# Patient Record
Sex: FEMALE | Race: WHITE
[De-identification: names, ages, dates, MRNs, and addresses within clinical notes are randomized per-mention and may not be internally consistent; named-entity substitution may affect disease eponyms.]

---

## 2019-09-05 ENCOUNTER — HOSPITAL ENCOUNTER (EMERGENCY)
Dept: HOSPITAL 62 - ER | Age: 64
Discharge: HOME | End: 2019-09-05
Payer: SELF-PAY

## 2019-09-05 VITALS — SYSTOLIC BLOOD PRESSURE: 110 MMHG | DIASTOLIC BLOOD PRESSURE: 85 MMHG
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
## 2019-09-05 DIAGNOSIS — Z79.899: ICD-10-CM

## 2019-09-05 DIAGNOSIS — M25.522: ICD-10-CM

## 2019-09-05 DIAGNOSIS — M79.642: ICD-10-CM

## 2019-09-05 DIAGNOSIS — M25.532: ICD-10-CM

## 2019-09-05 DIAGNOSIS — S52.125A: Primary | ICD-10-CM

## 2019-09-05 PROCEDURE — 2W3DX1Z IMMOBILIZATION OF LEFT LOWER ARM USING SPLINT: ICD-10-PCS | Performed by: EMERGENCY MEDICINE

## 2019-09-05 PROCEDURE — 99283 EMERGENCY DEPT VISIT LOW MDM: CPT

## 2019-09-05 NOTE — RADIOLOGY REPORT (SQ)
EXAM DESCRIPTION:  HAND LEFT 3 VIEWS; WRIST LEFT 3 VIEWS; ELBOW LEFT AP/LATERAL



COMPLETED DATE/TIME:  9/5/2019 12:48 pm



REASON FOR STUDY:  fall, wrist/hand injury; fall, FOOSH, elbow pain



COMPARISON:  None.



EXAM PARAMETERS:  NUMBER OF VIEWS: Seven views.

TECHNIQUE: Two views left elbow, three views left wrist, three views left hand.

LIMITATIONS: None.



FINDINGS:  MINERALIZATION: Osteopenia.

BONES: Nondisplaced fracture of the distal radial metaphysis.

JOINTS: Erosive arthropathy base of 1st metacarpal.

SOFT TISSUES: No foreign body.

OTHER: No other significant finding.



IMPRESSION:  Nondisplaced fracture distal radius.



TECHNICAL DOCUMENTATION:  JOB ID:  [id number]

 2011 [employer]- All Rights Reserved



Reading location - IP/workstation name: [email or web address]

## 2019-09-05 NOTE — RADIOLOGY REPORT (SQ)
EXAM DESCRIPTION:  HAND LEFT 3 VIEWS; WRIST LEFT 3 VIEWS; ELBOW LEFT AP/LATERAL



COMPLETED DATE/TIME:  9/5/2019 12:48 pm



REASON FOR STUDY:  fall, wrist/hand injury; fall, FOOSH, elbow pain



COMPARISON:  None.



EXAM PARAMETERS:  NUMBER OF VIEWS: Seven views.

TECHNIQUE: Two views left elbow, three views left wrist, three views left hand.

LIMITATIONS: None.



FINDINGS:  MINERALIZATION: Osteopenia.

BONES: Nondisplaced fracture of the distal radial metaphysis.

JOINTS: Erosive arthropathy base of 1st metacarpal.

SOFT TISSUES: No foreign body.

OTHER: No other significant finding.



IMPRESSION:  Nondisplaced fracture distal radius.



TECHNICAL DOCUMENTATION:  JOB ID:  [id number]

 2011 [employer]- All Rights Reserved



Reading location - IP/workstation name: [email or web address]

## 2019-09-05 NOTE — ER DOCUMENT REPORT
ED Hand/Wrist Injury





- General


Chief Complaint: Wrist Pain


Stated Complaint: FALL/WRIST PAIN


Time Seen by Provider: 09/05/19 12:18


Primary Care Provider: 


MARY DUMONT DO [ACTIVE STAFF] - Follow up in 3-5 days (FOR ORTHOPEDIC FOLLOW 

UP)





- HPI


Notes: 





64-year-old female to the emergency department with complaints of left wrist and

hand pain after she fell just prior to arrival.  She states that she was walking

her dog when she slipped and fell onto a outstretched hand.  She states that she

has swelling to the back of the wrist and is very tender.  She states any 

movement increases her pain.  She states that the pain radiates up to her left 

elbow.  She denies any other injuries.  She did not hit her head.  She did not 

have loss of consciousness.  She did take a family members Percocet prior to ar

rival which has not started to help with her pain. 





- Related Data


Allergies/Adverse Reactions: 


                                        





No Known Allergies Allergy (Unverified 09/05/19 12:10)


   











Past Medical History





- General


Information source: Patient, Relative





- Social History


Smoking Status: Never Smoker


Frequency of alcohol use: None


Drug Abuse: None


Family History: Reviewed & Not Pertinent





Review of Systems





- Review of Systems


Constitutional: denies: Chills, Fever


EENT: No symptoms reported


Cardiovascular: denies: Chest pain, Palpitations


Respiratory: denies: Cough, Short of breath


Gastrointestinal: denies: Abdominal pain, Diarrhea, Nausea, Vomiting


Genitourinary: No symptoms reported


Musculoskeletal: See HPI, Joint pain, Joint swelling - Left wrist, hand, elbow 

pain after fall.  denies: Back pain


Skin: No symptoms reported


Hematologic/Lymphatic: No symptoms reported


Neurological/Psychological: No symptoms reported


-: Yes All other systems reviewed and negative





Physical Exam





- Vital signs


Vitals: 


                                        











Temp Pulse Resp BP Pulse Ox


 


 98 F   89   18   110/85   97 


 


 09/05/19 12:09  09/05/19 12:09  09/05/19 12:09 09/05/19 12:09 09/05/19 12:09











Interpretation: Normal





- General


General appearance: Appears well, Alert


In distress: Mild


Notes: 





Patient is guarding the left wrist and has mild pain distress.





- HEENT


Head: Normocephalic, Atraumatic


Eyes: Normal


Pupils: PERRL





- Respiratory


Respiratory status: No respiratory distress


Chest status: Nontender


Breath sounds: Normal


Chest palpation: Normal





- Cardiovascular


Rhythm: Regular


Heart sounds: Normal auscultation


Murmur: No





- Extremities


Elbow: Tender - There is tenderness to palpation over the posterior left elbow 

with no edema, laceration, deformity, evidence of dislocation.  Patient is able 

to move the left elbow against resistance with 5 out of 5 strength in flexion 

extension with pain.  There is no tenderness to palpation over the left 

shoulder.


Wrist: Tender - There is tenderness to palpation over the left dorsal wrist with

noted edema and deformity to the distal radial aspect of the wrist.  There is 

positive snuffbox tenderness on exam.  Patient is only able to  minimally 

with her hand due to pain she has strength of 3 out of 5 with left hand .  

Right hand  is 5 out of 5 in strength.  Radial pulses are intact and equal. 

Compartments are soft., Deformity, Ecchymosis, Limited ROM


Hand: Tender - There is tenderness to palpation over the left snuff box region 

and to the base of the thumb.  There is no dorsal hand swelling or evidence for 

laceration.  There is no gross deformity.


Hip: Normal, Nontender


Thigh: Normal, Nontender


Knee: Normal, Nontender


Ankle: Normal, Nontender


Foot: Normal, Nontender





- Neurological


Neuro grossly intact: Yes


Cognition: Normal


Orientation: AAOx4


[place] Coma Scale Eye Opening: Spontaneous


[place] Coma Scale Verbal: Oriented


[place] Coma Scale Motor: Obeys Commands


[place] Coma Scale Total: 15


Speech: Normal


Motor strength normal: LUE, RUE, LLE, RLE


Sensory: Normal





- Psychological


Associated symptoms: Normal affect, Normal mood





- Skin


Skin Temperature: Warm


Skin Moisture: Dry


Skin Color: Normal





Course





- Re-evaluation


Re-evalutation: 





09/05/19 13:40





                                        





Hand X-Ray  09/05/19 12:18


IMPRESSION:  Nondisplaced fracture distal radius.


 








Wrist X-Ray  09/05/19 12:18


IMPRESSION:  Nondisplaced fracture distal radius.


 








Elbow X-Ray  09/05/19 12:30


IMPRESSION:  Nondisplaced fracture distal radius.


 








Impression: Fall on outstretched hand injury with noted nondisplaced distal 

radial fracture.  Did splint the patient here in the emergency department with 

the volar splint.  We will have her follow with orthopedist early next week.  We

have placed patient in a sling and have encouraged her to rest, ice, elevate the

wrist.  Encouraged to keep the splint on without fail.  Will write for pain 

medicine for home use.





- Vital Signs


Vital signs: 


                                        











Temp Pulse Resp BP Pulse Ox


 


 98 F   89   18   110/85   97 


 


 09/05/19 12:09 09/05/19 12:09  09/05/19 12:09  09/05/19 12:09 09/05/19 12:09














Procedures





- Immobilization


  ** Left Wrist


Time completed: 13:18


Pre-Proc Neuro Vasc Exam: Normal


Immobilizer type: Volar splint


Performed by: PCT


Post-Proc Neuro Vasc Exam: Normal


Alignment checked and good: Yes





Discharge





- Discharge


Clinical Impression: 


Left radial fracture


Qualifiers:


 Encounter type: initial encounter Radius location: head Fracture type: closed 

Fracture alignment: nondisplaced Qualified Code(s): S52.125A - Nondisplaced 

fracture of head of left radius, initial encounter for closed fracture





Fall


Qualifiers:


 Encounter type: initial encounter Qualified Code(s): W19.XXXA - Unspecified 

fall, initial encounter





Condition: Stable


Disposition: HOME, SELF-CARE


Instructions:  Fractured Radius ([facility])


Additional Instructions: 


KEEP WRIST SPLINTED WITHOUT FAIL.  TAKE PAIN MEDS AS PRESCRIBED.   KEEP WRIST 

SPLINTED.  MAY PLACE COLD PACK ON THE WRIST THREE TIMES A DAY FOR 20 MINUTES.  

DO NOT GET SPLINT WET.  CALL ORTHOPEDIST ON MONDAY WITHOUT FAIL FOR FOLLOW UP. 

RETURN IF WORSENING PAIN, FEVERS, COLOR CHANGE, OR ANY OTHER CONCERNS. 


Referrals: 


MARY DUMONT DO [ACTIVE STAFF] - Follow up in 3-5 days (FOR ORTHOPEDIC FOLLOW 

UP)

## 2019-09-05 NOTE — RADIOLOGY REPORT (SQ)
EXAM DESCRIPTION:  HAND LEFT 3 VIEWS; WRIST LEFT 3 VIEWS; ELBOW LEFT AP/LATERAL



COMPLETED DATE/TIME:  9/5/2019 12:48 pm



REASON FOR STUDY:  fall, wrist/hand injury; fall, FOOSH, elbow pain



COMPARISON:  None.



EXAM PARAMETERS:  NUMBER OF VIEWS: Seven views.

TECHNIQUE: Two views left elbow, three views left wrist, three views left hand.

LIMITATIONS: None.



FINDINGS:  MINERALIZATION: Osteopenia.

BONES: Nondisplaced fracture of the distal radial metaphysis.

JOINTS: Erosive arthropathy base of 1st metacarpal.

SOFT TISSUES: No foreign body.

OTHER: No other significant finding.



IMPRESSION:  Nondisplaced fracture distal radius.



TECHNICAL DOCUMENTATION:  JOB ID:  [id number]

 2011 [employer]- All Rights Reserved



Reading location - IP/workstation name: [email or web address]

## 2019-10-06 ENCOUNTER — HOSPITAL ENCOUNTER (OUTPATIENT)
Dept: HOSPITAL 62 - ER | Age: 64
Setting detail: OBSERVATION
LOS: 4 days | Discharge: LEFT BEFORE DISCHARGE | End: 2019-10-10
Attending: FAMILY MEDICINE | Admitting: FAMILY MEDICINE
Payer: SELF-PAY

## 2019-10-06 DIAGNOSIS — R55: ICD-10-CM

## 2019-10-06 DIAGNOSIS — R93.0: ICD-10-CM

## 2019-10-06 DIAGNOSIS — Z91.81: ICD-10-CM

## 2019-10-06 DIAGNOSIS — R79.89: ICD-10-CM

## 2019-10-06 DIAGNOSIS — R51: ICD-10-CM

## 2019-10-06 DIAGNOSIS — R41.3: ICD-10-CM

## 2019-10-06 DIAGNOSIS — G89.29: ICD-10-CM

## 2019-10-06 DIAGNOSIS — Z82.49: ICD-10-CM

## 2019-10-06 DIAGNOSIS — I10: ICD-10-CM

## 2019-10-06 DIAGNOSIS — Z98.84: ICD-10-CM

## 2019-10-06 DIAGNOSIS — Z82.3: ICD-10-CM

## 2019-10-06 DIAGNOSIS — H53.8: ICD-10-CM

## 2019-10-06 DIAGNOSIS — I73.9: ICD-10-CM

## 2019-10-06 DIAGNOSIS — M54.9: ICD-10-CM

## 2019-10-06 DIAGNOSIS — Z87.11: ICD-10-CM

## 2019-10-06 DIAGNOSIS — I25.2: ICD-10-CM

## 2019-10-06 DIAGNOSIS — I25.10: ICD-10-CM

## 2019-10-06 DIAGNOSIS — H53.459: ICD-10-CM

## 2019-10-06 DIAGNOSIS — Z87.891: ICD-10-CM

## 2019-10-06 DIAGNOSIS — G93.6: Primary | ICD-10-CM

## 2019-10-06 DIAGNOSIS — H53.149: ICD-10-CM

## 2019-10-06 DIAGNOSIS — Z82.0: ICD-10-CM

## 2019-10-06 DIAGNOSIS — R11.2: ICD-10-CM

## 2019-10-06 DIAGNOSIS — Z79.899: ICD-10-CM

## 2019-10-06 LAB
ADD MANUAL DIFF: NO
ALBUMIN SERPL-MCNC: 4.3 G/DL (ref 3.5–5)
ALP SERPL-CCNC: 97 U/L (ref 38–126)
ANION GAP SERPL CALC-SCNC: 8 MMOL/L (ref 5–19)
AST SERPL-CCNC: 214 U/L (ref 14–36)
BASOPHILS # BLD AUTO: 0 10^3/UL (ref 0–0.2)
BASOPHILS NFR BLD AUTO: 0.9 % (ref 0–2)
BILIRUB DIRECT SERPL-MCNC: 0.1 MG/DL (ref 0–0.4)
BILIRUB SERPL-MCNC: 0.5 MG/DL (ref 0.2–1.3)
BUN SERPL-MCNC: 10 MG/DL (ref 7–20)
CALCIUM: 9.9 MG/DL (ref 8.4–10.2)
CHLORIDE SERPL-SCNC: 100 MMOL/L (ref 98–107)
CO2 SERPL-SCNC: 31 MMOL/L (ref 22–30)
EOSINOPHIL # BLD AUTO: 0.1 10^3/UL (ref 0–0.6)
EOSINOPHIL NFR BLD AUTO: 1.1 % (ref 0–6)
ERYTHROCYTE [DISTWIDTH] IN BLOOD BY AUTOMATED COUNT: 14.7 % (ref 11.5–14)
GLUCOSE SERPL-MCNC: 101 MG/DL (ref 75–110)
HCT VFR BLD CALC: 43.7 % (ref 36–47)
HGB BLD-MCNC: 14.4 G/DL (ref 12–15.5)
LYMPHOCYTES # BLD AUTO: 1.5 10^3/UL (ref 0.5–4.7)
LYMPHOCYTES NFR BLD AUTO: 27.5 % (ref 13–45)
MCH RBC QN AUTO: 30 PG (ref 27–33.4)
MCHC RBC AUTO-ENTMCNC: 33 G/DL (ref 32–36)
MCV RBC AUTO: 91 FL (ref 80–97)
MONOCYTES # BLD AUTO: 0.4 10^3/UL (ref 0.1–1.4)
MONOCYTES NFR BLD AUTO: 8.3 % (ref 3–13)
NEUTROPHILS # BLD AUTO: 3.3 10^3/UL (ref 1.7–8.2)
NEUTS SEG NFR BLD AUTO: 62.2 % (ref 42–78)
PLATELET # BLD: 310 10^3/UL (ref 150–450)
POTASSIUM SERPL-SCNC: 4.7 MMOL/L (ref 3.6–5)
PROT SERPL-MCNC: 7.4 G/DL (ref 6.3–8.2)
RBC # BLD AUTO: 4.81 10^6/UL (ref 3.72–5.28)
TOTAL CELLS COUNTED % (AUTO): 100 %
WBC # BLD AUTO: 5.4 10^3/UL (ref 4–10.5)

## 2019-10-06 PROCEDURE — A9576 INJ PROHANCE MULTIPACK: HCPCS

## 2019-10-06 PROCEDURE — 96374 THER/PROPH/DIAG INJ IV PUSH: CPT

## 2019-10-06 PROCEDURE — G0378 HOSPITAL OBSERVATION PER HR: HCPCS

## 2019-10-06 PROCEDURE — 82553 CREATINE MB FRACTION: CPT

## 2019-10-06 PROCEDURE — 99285 EMERGENCY DEPT VISIT HI MDM: CPT

## 2019-10-06 PROCEDURE — 84443 ASSAY THYROID STIM HORMONE: CPT

## 2019-10-06 PROCEDURE — 84439 ASSAY OF FREE THYROXINE: CPT

## 2019-10-06 PROCEDURE — 96375 TX/PRO/DX INJ NEW DRUG ADDON: CPT

## 2019-10-06 PROCEDURE — 96361 HYDRATE IV INFUSION ADD-ON: CPT

## 2019-10-06 PROCEDURE — 86140 C-REACTIVE PROTEIN: CPT

## 2019-10-06 PROCEDURE — 36415 COLL VENOUS BLD VENIPUNCTURE: CPT

## 2019-10-06 PROCEDURE — 82550 ASSAY OF CK (CPK): CPT

## 2019-10-06 PROCEDURE — 96376 TX/PRO/DX INJ SAME DRUG ADON: CPT

## 2019-10-06 PROCEDURE — 93005 ELECTROCARDIOGRAM TRACING: CPT

## 2019-10-06 PROCEDURE — 85025 COMPLETE CBC W/AUTO DIFF WBC: CPT

## 2019-10-06 PROCEDURE — 93010 ELECTROCARDIOGRAM REPORT: CPT

## 2019-10-06 PROCEDURE — 84481 FREE ASSAY (FT-3): CPT

## 2019-10-06 PROCEDURE — 70553 MRI BRAIN STEM W/O & W/DYE: CPT

## 2019-10-06 PROCEDURE — 80053 COMPREHEN METABOLIC PANEL: CPT

## 2019-10-06 PROCEDURE — 70450 CT HEAD/BRAIN W/O DYE: CPT

## 2019-10-06 PROCEDURE — 85027 COMPLETE CBC AUTOMATED: CPT

## 2019-10-06 PROCEDURE — 84484 ASSAY OF TROPONIN QUANT: CPT

## 2019-10-06 PROCEDURE — 85652 RBC SED RATE AUTOMATED: CPT

## 2019-10-06 PROCEDURE — 83735 ASSAY OF MAGNESIUM: CPT

## 2019-10-06 PROCEDURE — 80048 BASIC METABOLIC PNL TOTAL CA: CPT

## 2019-10-06 RX ADMIN — FENTANYL CITRATE PRN MCG: 50 INJECTION INTRAMUSCULAR; INTRAVENOUS at 21:05

## 2019-10-06 RX ADMIN — FENTANYL CITRATE PRN MCG: 50 INJECTION INTRAMUSCULAR; INTRAVENOUS at 21:39

## 2019-10-06 RX ADMIN — FENTANYL CITRATE PRN MCG: 50 INJECTION INTRAMUSCULAR; INTRAVENOUS at 23:21
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
## 2019-10-06 NOTE — RADIOLOGY REPORT (SQ)
EXAM DESCRIPTION: 



CT HEAD WITHOUT IV CONTRAST



COMPLETED DATE/TME:  10/06/2019 20:06



CLINICAL HISTORY: 



64 years, Female, headache, syncope



COMPARISON:

EXAM DESCRIPTION: 







CLINICAL HISTORY: 



headache, syncope



COMPARISON: 



None Available



TECHNIQUE: 



Contiguous axial CT images of the head were obtained.



Coronal and sagittal reconstructions were created from the axial

data.



This exam was performed according to our departmental

dose-optimization program, which includes automated exposure

control, adjustment of the mA and/or kV according to patient size

and/or use of iterative reconstruction technique.



FINDINGS:  There is poorly defined decreased attenuation in the

right parieto-occipital region involving the deep white matter

and subcortical white matter and portions of the cortex.

Possibilities include ischemia or neoplasm or inflammatory

process and MRI is recommended to include contrast for further

evaluation if the clinical picture is unclear.



 There is also poorly defined decreased attenuation much smaller

in volume involving the left occipital lobe deep white matter

subcortical white matter and cortex.



Poorly defined foci of decreased attenuation elsewhere do not

exert significant mass effect on surrounding structures and are

likely sequela of prior insult, most likely on the basis of small

vessel disease.







No other acute abnormality. No acute abnormalities of the bones

is seen.



IMPRESSION: Findings are most concerning for neoplasm but

ischemia or inflammation are certainly also possible and

follow-up MRI is recommended for further evaluation if the

clinical picture is unclear.



TECHNIQUE:

Images stored on PACS.

 

All CT scanners at this facility use dose modulation, iterative

reconstruction, and/or weight based dosing when appropriate to

reduce radiation dose to as low as reasonably achievable (ALARA).





[facility]: Dose Right [facility]: CareDose   [facility]: Dose Right    [facility]:

Teradose 4D    [facility]: [employer] Technologies



LIMITATIONS:

None.



FINDINGS:









IMPRESSION:





 

TECHNICAL DOCUMENTATION:



Quality ID # 436: Final reports with documentation of one or more

dose reduction techniques (e.g., Automated exposure control,

adjustment of the mA and/or kV according to patient size, use of

iterative reconstruction technique)



copyright 2011 [employer]- All Rights Reserved

## 2019-10-07 LAB
CK MB SERPL-MCNC: 0.23 NG/ML (ref ?–4.55)
CK MB SERPL-MCNC: 0.3 NG/ML (ref ?–4.55)
CK MB SERPL-MCNC: < 0.22 NG/ML (ref ?–4.55)
CK SERPL-CCNC: < 20 U/L (ref 30–135)
FREE T4 (FREE THYROXINE): 1.04 NG/DL (ref 0.78–2.19)
T3FREE SERPL-MCNC: 4.35 PG/ML (ref 2.77–5.27)
TROPONIN I SERPL-MCNC: 0.02 NG/ML
TROPONIN I SERPL-MCNC: < 0.012 NG/ML
TROPONIN I SERPL-MCNC: < 0.012 NG/ML
TSH SERPL-ACNC: 1.92 UIU/ML (ref 0.47–4.68)

## 2019-10-07 RX ADMIN — DOCUSATE SODIUM SCH MG: 100 CAPSULE, LIQUID FILLED ORAL at 18:41

## 2019-10-07 RX ADMIN — HYDRALAZINE HYDROCHLORIDE PRN MG: 20 INJECTION INTRAMUSCULAR; INTRAVENOUS at 09:10

## 2019-10-07 RX ADMIN — PHENYTOIN SODIUM SCH MG: 50 INJECTION INTRAMUSCULAR; INTRAVENOUS at 22:59

## 2019-10-07 RX ADMIN — DEXTROSE, SODIUM CHLORIDE, SODIUM LACTATE, POTASSIUM CHLORIDE, AND CALCIUM CHLORIDE PRN MLS/HR: 5; .6; .31; .03; .02 INJECTION, SOLUTION INTRAVENOUS at 01:33

## 2019-10-07 RX ADMIN — DOCUSATE SODIUM SCH MG: 100 CAPSULE, LIQUID FILLED ORAL at 12:20

## 2019-10-07 RX ADMIN — MORPHINE SULFATE PRN MG: 10 INJECTION INTRAMUSCULAR; INTRAVENOUS; SUBCUTANEOUS at 09:20

## 2019-10-07 RX ADMIN — DEXTROSE, SODIUM CHLORIDE, SODIUM LACTATE, POTASSIUM CHLORIDE, AND CALCIUM CHLORIDE PRN MLS/HR: 5; .6; .31; .03; .02 INJECTION, SOLUTION INTRAVENOUS at 23:25

## 2019-10-07 RX ADMIN — HYDROMORPHONE HYDROCHLORIDE PRN MG: 2 INJECTION INTRAMUSCULAR; INTRAVENOUS; SUBCUTANEOUS at 18:42

## 2019-10-07 RX ADMIN — FAMOTIDINE SCH MG: 20 TABLET, FILM COATED ORAL at 12:20

## 2019-10-07 RX ADMIN — NALBUPHINE HYDROCHLORIDE PRN MG: 10 INJECTION, SOLUTION INTRAMUSCULAR; INTRAVENOUS; SUBCUTANEOUS at 01:34

## 2019-10-07 RX ADMIN — MORPHINE SULFATE PRN MG: 10 INJECTION INTRAMUSCULAR; INTRAVENOUS; SUBCUTANEOUS at 14:38

## 2019-10-07 RX ADMIN — NALBUPHINE HYDROCHLORIDE PRN MG: 10 INJECTION, SOLUTION INTRAMUSCULAR; INTRAVENOUS; SUBCUTANEOUS at 06:28

## 2019-10-07 RX ADMIN — DEXAMETHASONE SODIUM PHOSPHATE PRN MG: 10 INJECTION INTRAMUSCULAR; INTRAVENOUS at 18:43

## 2019-10-07 RX ADMIN — Medication SCH: at 06:35

## 2019-10-07 RX ADMIN — Medication SCH ML: at 23:03

## 2019-10-07 RX ADMIN — HEPARIN SODIUM SCH UNIT: 5000 INJECTION, SOLUTION INTRAVENOUS; SUBCUTANEOUS at 06:28

## 2019-10-07 RX ADMIN — DEXAMETHASONE SODIUM PHOSPHATE PRN MG: 10 INJECTION INTRAMUSCULAR; INTRAVENOUS at 01:33

## 2019-10-07 RX ADMIN — FAMOTIDINE SCH MG: 20 TABLET, FILM COATED ORAL at 01:33

## 2019-10-07 RX ADMIN — DEXAMETHASONE SODIUM PHOSPHATE PRN MG: 10 INJECTION INTRAMUSCULAR; INTRAVENOUS at 23:01

## 2019-10-07 RX ADMIN — HEPARIN SODIUM SCH UNIT: 5000 INJECTION, SOLUTION INTRAVENOUS; SUBCUTANEOUS at 23:02

## 2019-10-07 RX ADMIN — FAMOTIDINE SCH MG: 20 TABLET, FILM COATED ORAL at 23:03

## 2019-10-07 RX ADMIN — AMLODIPINE BESYLATE SCH MG: 5 TABLET ORAL at 12:19

## 2019-10-07 RX ADMIN — Medication SCH ML: at 14:39

## 2019-10-07 RX ADMIN — HEPARIN SODIUM SCH UNIT: 5000 INJECTION, SOLUTION INTRAVENOUS; SUBCUTANEOUS at 14:41

## 2019-10-07 RX ADMIN — DEXTROSE, SODIUM CHLORIDE, SODIUM LACTATE, POTASSIUM CHLORIDE, AND CALCIUM CHLORIDE PRN MLS/HR: 5; .6; .31; .03; .02 INJECTION, SOLUTION INTRAVENOUS at 17:09

## 2019-10-07 RX ADMIN — MORPHINE SULFATE PRN MG: 10 INJECTION INTRAMUSCULAR; INTRAVENOUS; SUBCUTANEOUS at 23:09

## 2019-10-07 RX ADMIN — DEXTROSE, SODIUM CHLORIDE, SODIUM LACTATE, POTASSIUM CHLORIDE, AND CALCIUM CHLORIDE PRN MLS/HR: 5; .6; .31; .03; .02 INJECTION, SOLUTION INTRAVENOUS at 07:53
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
## 2019-10-07 NOTE — RADIOLOGY REPORT (SQ)
EXAM DESCRIPTION:  MRI HEAD COMBO



COMPLETED DATE/TIME:  10/7/2019 10:05 am



REASON FOR STUDY:  Abnormal CT scan



COMPARISON:  CT brain 10/6/2019



TECHNIQUE:  Multiplanar imaging includes noncontrasted T1, T2, FLAIR, diffusion with ADC map and post
gadolinium contrast T1 sequences. Images stored on PACS.



CONTRAST TYPE AND DOSE:  15 mL Dotarem.



RENAL FUNCTION:  Not indicated.  ACR Type II contrast agent associated with few, if any, unconfounded
 cases of NSF



LIMITATIONS:  None.



FINDINGS:  ANATOMY: No developmental anomalies. Normal vascular flow voids. Pituitary fossa normal.

CSF SPACES: Normal in size and contour. No hemorrhage.

CEREBRUM: Abnormal decreased T1 and increased FLAIR/T2 signal is present in the right posterior tempo
ral and occipital cortex and subcortical white matter.  There is mild local mass effect with sulcal e
ffacement.  No abnormal contrast enhancement.  No restricted diffusion.  This finding could reflect e
alexia related to cortical vein thrombosis.  Tumefactive MS is possible.  Edema related to posterior re
versible encephalopathy is possible although considered unlikely given its asymmetric nature.  Tumor/
gliosis is possible although considered less likely given patient's acute neurologic change over the 
last 3 days.  These findings were discussed with RENEE Swanson, 1000 hours 10/7/2019.

Remainder of the cerebrum demonstrates few punctate foci of increased FLAIR/T2 signal along perivascu
lar spaces over the bifrontal and biparietal convexities on FLAIR images.

POSTERIOR FOSSA: No signal alteration. No hemorrhage. No edema, masses, or mass effect. Internal carol
tory canals, cerebellopontine angles, mastoids normal. No enhancing lesions. No abnormal enhancement 
post contrast.

DIFFUSION IMAGING: Negative for acute or subacute infarction.

ORBITS: No masses.  Post left cataract surgery.

PARANASAL SINUSES: No fluid levels. Mucosa normal.

OTHER: No other significant finding.



IMPRESSION:  Right posterior temporal/occipital cortical and subcortical white matter signal abnormal
ities without altered diffusion.  Differential is venous infarct vs tumefactive MS vs asymmetric post
erior reversible encephalopathy

EVIDENCE OF ACUTE STROKE: Possible atypical venous infarct.



COMMENT:  Findings discussed with RENEE Swanson



TECHNICAL DOCUMENTATION:  JOB ID:  [id number]

 2011 [employer]- All Rights Reserved



Reading location - IP/workstation name: [email or web address]

## 2019-10-07 NOTE — PDOC TRANSFER SUMMARY
General


Admission Date/PCP: 


  10/06/19 23:36





  





Resuscitation Status: Full Code





- Transfer Diagnosis


(1) Abnormal CT scan of head


Is this a current diagnosis for this admission?: Yes   





(2) Frontal headache


Is this a current diagnosis for this admission?: Yes   





(3) Hypertension


Is this a current diagnosis for this admission?: Yes   








(5) Episode of syncope


Is this a current diagnosis for this admission?: Yes   





(6) Coronary artery disease


Is this a current diagnosis for this admission?: Yes   





- Transfer Medications


Home Medications: 


                                        





Cholecalciferol (Vitamin D3) [Vitamin D3 1000 Unit Tablet] 1,000 unit PO DAILY 

10/07/19 


Ferrous Sulfate [Feosol 325 mg Tablet] 325 mg PO DAILY 10/07/19 


L.acidoph,Paracasei, B.lactis [Probiotic] 1 cap PO DAILY 10/07/19 








Transfer Medications: 


                               Current Medications





Acetaminophen (Tylenol 325 Mg Tablet)  650 mg PO Q4HP PRN


   PRN Reason: For headache, pain or fever


   Stop: 11/05/19 23:42


Acetaminophen (Tylenol 650 Mg Supp)  650 mg UT Q4HP PRN


   PRN Reason: For headache, pain or fever


   Stop: 11/05/19 23:42


Al Hydrox/Mg Hydrox/Simethicone (Maalox Plus Susp 30 Udcup)  30 ml PO Q6HP PRN


   PRN Reason: HEARTBURN


   Stop: 11/05/19 23:37


Amlodipine Besylate (Norvasc 5 Mg Tablet)  5 mg PO DAILY FREDY


   Stop: 11/06/19 09:59


   Last Admin: 10/07/19 12:19 Dose:  5 mg


   Documented by: 


Dexamethasone Sodium Phosphate (Decadron Inj 10 Mg/1 Ml Vial)  5 mg IV Q6HP PRN


   PRN Reason: FOR HEADACHE


   Last Admin: 10/07/19 01:33 Dose:  5 mg


   Documented by: 


Docusate Sodium (Colace 100 Mg Capsule)  100 mg PO BID FREDY


   Stop: 11/06/19 09:59


   Last Admin: 10/07/19 12:20 Dose:  100 mg


   Documented by: 


Famotidine (Pepcid 20 Mg Tablet)  20 mg PO Q12 FREDY


   Stop: 11/05/19 23:44


   Last Admin: 10/07/19 12:20 Dose:  20 mg


   Documented by: 


Heparin Sodium (Porcine) (Heparin Inj 5,000 Units/Ml 1 Ml Vial)  5,000 unit 

SUBCUT Q8 FREDY


   Stop: 11/06/19 05:59


   Last Admin: 10/07/19 14:41 Dose:  5,000 unit


   Documented by: 


Hydralazine HCl (Apresoline Inj/Pf 20 Mg/1 Ml Sdv)  10 mg IV Q4HP PRN


   PRN Reason: Give For Sbp > 160 / Dbp > 90


   Stop: 11/06/19 08:53


   Last Admin: 10/07/19 09:10 Dose:  10 mg


   Documented by: 


Hydromorphone HCl (Dilaudid Inj/Pf 2 Mg/Ml Ampule)  1 mg IV Q6HP PRN


   PRN Reason: FOR PAIN


   Stop: 10/14/19 17:30


Dextrose/Lactated Ringer's (D5lr 1000 Ml Iv Soln)  1,000 mls @ 167 mls/hr IV 

CONTINUOUS PRN


   PRN Reason: THIS MED IS NOT "PRN"


   Stop: 11/05/19 23:37


   Last Admin: 10/07/19 17:09 Dose:  167 mls/hr


   Documented by: 


Magnesium Hydroxide (Milk Of Magnesia 30 Ml Udcup)  30 ml PO DAILYP PRN


   PRN Reason: FOR CONSTIPATION


   Stop: 11/05/19 23:37


Morphine Sulfate (Morphine 10 Mg/Ml Inj)  2 mg IV Q4HP PRN


   PRN Reason: FOR PAIN


   Stop: 10/14/19 08:50


   Last Admin: 10/07/19 14:38 Dose:  2 mg


   Documented by: 


Ondansetron HCl (Zofran Inj/Pf 4 Mg/2 Ml Sdv)  4 mg IV Q6HP PRN


   PRN Reason: FOR NAUSEA/VOMITING


   Stop: 11/06/19 18:27


Phenytoin Sodium (Dilantin Inj/Pf 100 Mg/2 Ml Sdv)  100 mg IV Q8 FREDY


   Stop: 11/06/19 21:59


Phenytoin Sodium (Dilantin Inj/Pf 250 Mg/5 Ml Sdv)  1,000 mg IV NOW ONE


   Stop: 10/07/19 19:16


Prochlorperazine Edisylate (Compazine Inj 10 Mg/2 Ml Vial)  10 mg IM Q6HP PRN


   PRN Reason: FOR HEADACHE


Sodium Chloride (Saline Flush 2.5 Ml Monoject Prefil Syrin)  2.5 ml IV Q8 FREDY


   Stop: 11/06/19 05:59


   Last Admin: 10/07/19 14:39 Dose:  2.5 ml


   Documented by: 


Temazepam (Restoril 15 Mg Capsule)  30 mg PO HSP PRN


   PRN Reason: SLEEP OR INSOMNIA


   Stop: 10/13/19 23:37











- Allergies


Allergies/Adverse Reactions: 


                                        





No Known Allergies Allergy (Unverified 09/05/19 12:10)


   











Hospital Course


Hospital Course: 





10/7/2019


Patient was admitted last night through the emergency room around 2300 hrs. 

Patient states that on Saturday, 1 day ago she developed a headache rather 

suddenly.  He states it is located behind her eyes and goes back to the back of 

her head.  She states it persisted all day Saturday and Sunday and that is when 

she came to the emergency room Sunday night.  Patient also states that she was n

auseated and had vomiting on Saturday and Sunday as well.  She also states that 

she saw flashing lights which she describes as "fireworks".





She denies any nuchal rigidity, denies any recent travels out of the state or 

out of the country.  She denies any recent illnesses such as colds or flus or 

viruses.  He denies any recent fevers, and/or tick bites..





She states that she has pretension and had a cardiac cath done 4 years ago as 

well that was normal.  Does state that she has been told she had "3 small heart 

attacks past.





[place] patient's blood pressure was elevated 191/97 his morning it was still 

high at 180/87 she has been afebrile this whole time.. CBC is normal showing no 

leukocytosis, does have elevated liver functions TSH is normal.





CT head scan done last night in the emergency was abnormal and recommended MRI. 

 MRI of the brain today with gadolinium is also grossly abnormal showing edema 

in 2 separate locations, however no obvious neoplasm.  Differential would 

include ischemic etiology, as neoplasm, versus infection.





Patient would benefit from a higher level of care where neurology/ neurosurgery 

is available.  


Dr. Sales has graciously agreed to accept the patient on the hospitalist 

service and consult the appropriate providers.





Patient will be maintained on her IV dexamethasone, IV Dilantin, Apresoline as 

needed, Dilaudid





Physical Exam


Vital Signs: 


                                        











Temp Pulse Resp BP Pulse Ox


 


 98.3 F   103 H  21 H  171/86 H  95 


 


 10/07/19 14:00  10/07/19 14:00  10/07/19 14:00  10/07/19 14:00  10/07/19 14:00








                                 Intake & Output











 10/06/19 10/07/19 10/08/19





 06:59 06:59 06:59


 


Intake Total  1000 2000


 


Output Total  900 


 


Balance  100 2000


 


Weight  68.7 kg 











General appearance: PRESENT: mild distress, other - Secondary to headache


Neck exam: ABSENT: carotid bruit, JVD, lymphadenopathy, thyromegaly


Respiratory exam: PRESENT: clear to auscultation claudia.  ABSENT: rales, rhonchi, 

wheezes


Cardiovascular exam: PRESENT: RRR.  ABSENT: diastolic murmur, rubs, systolic 

murmur


Neurological exam: PRESENT: alert, awake, oriented to person, oriented to place,

 oriented to time, oriented to situation, CN II-XII grossly intact.  ABSENT: 

motor sensory deficit


Psychiatric exam: PRESENT: appropriate affect, normal mood.  ABSENT: homicidal 

ideation, suicidal ideation





Results


Laboratory Results: 


                                        





                                 10/06/19 18:27 





                                 10/06/19 18:27 





                                        











  10/06/19 10/06/19 10/06/19





  18:27 18:27 18:27


 


WBC  5.4  


 


RBC  4.81  


 


Hgb  14.4  


 


Hct  43.7  


 


MCV  91  


 


MCH  30.0  


 


MCHC  33.0  


 


RDW  14.7 H  


 


Plt Count  310  


 


Seg Neutrophils %  62.2  


 


Sodium   139.2 


 


Potassium   4.7 


 


Chloride   100 


 


Carbon Dioxide   31 H 


 


Anion Gap   8 


 


BUN   10 


 


Creatinine   0.63 


 


Est GFR ( Amer)   > 60 


 


Glucose   101 


 


Calcium   9.9 


 


Magnesium   


 


Total Bilirubin   0.5 


 


AST   214 H 


 


Alkaline Phosphatase   97 


 


Total Protein   7.4 


 


Albumin   4.3 


 


TSH    1.92


 


Free T4    1.04


 


Free T3 pg/mL    4.35














  10/07/19





  06:40


 


WBC 


 


RBC 


 


Hgb 


 


Hct 


 


MCV 


 


MCH 


 


MCHC 


 


RDW 


 


Plt Count 


 


Seg Neutrophils % 


 


Sodium 


 


Potassium 


 


Chloride 


 


Carbon Dioxide 


 


Anion Gap 


 


BUN 


 


Creatinine 


 


Est GFR (African Amer) 


 


Glucose 


 


Calcium 


 


Magnesium  1.8


 


Total Bilirubin 


 


AST 


 


Alkaline Phosphatase 


 


Total Protein 


 


Albumin 


 


TSH 


 


Free T4 


 


Free T3 pg/mL 








                                        











  10/06/19 10/07/19 10/07/19





  18:27 00:36 00:36


 


Creatine Kinase   24 L 


 


CK-MB (CK-2)    0.23


 


Troponin I  < 0.012   < 0.012














  10/07/19 10/07/19 10/07/19





  06:40 06:40 13:46


 


Creatine Kinase  < 20 L   47


 


CK-MB (CK-2)   < 0.22 


 


Troponin I   < 0.012 














  10/07/19





  13:46


 


Creatine Kinase 


 


CK-MB (CK-2)  0.30


 


Troponin I  0.016











Impressions: 


                                        





Head CT  10/06/19 20:06


IMPRESSION: Findings are most concerning for neoplasm but


ischemia or inflammation are certainly also possible and


follow-up MRI is recommended for further evaluation if the


clinical picture is unclear.


 


TECHNIQUE:


Images stored on PACS.


 


All CT scanners at this facility use dose modulation, iterative


reconstruction, and/or weight based dosing when appropriate to


reduce radiation dose to as low as reasonably achievable (ALARA).


 


 


[facility]: Dose Right [facility]: CareDose   [facility]: Dose Right    [facility]:


Teradose 4D    [facility]: Smart Technologies


 


LIMITATIONS:


None.


 


FINDINGS:


 


 


 


 


IMPRESSION:


 


 


 


TECHNICAL DOCUMENTATION:


 


Quality ID # 436: Final reports with documentation of one or more


dose reduction techniques (e.g., Automated exposure control,


adjustment of the mA and/or kV according to patient size, use of


iterative reconstruction technique)


 


copyright 2011 [employer]- All Rights Reserved


 








Head MRI  10/07/19 00:00


IMPRESSION:  Right posterior temporal/occipital cortical and subcortical white 

matter signal abnormalities without altered diffusion.  Differential is venous 

infarct vs tumefactive MS vs asymmetric posterior reversible encephalopathy


EVIDENCE OF ACUTE STROKE: Possible atypical venous infarct.


 














Plan


Discharge Plan: 





Patient has been accepted at [facility] by Dr. Sales, hospitalist, 

who will consult neurology.. She is medically stable at this time for transfer. 

 Patient was given a loading dose of Dilantin the thousand milligrams IV and 

then 100 mg IV every 8 hours. . She will continue her Apresoline IV 4 hours as 

needed for blood pressure.  She was also started on Norvasc 5 mg daily.  

Patient's blood pressure was tried to be in the range of 160 systolic and any 

diastolic.  Did not want to drop her blood pressure down too low fear of lack of

 perfusion. patient had no relief from her headache with morphine and therefore 

Dilaudid was ordered milligram IV every 4 hours as needed.





She is stable to go by [facility] ground transport.  Patient's , sister, and 

father have all been spoken to concerning her transfer.  They as well as the pat

ient are in agreement for transfer


Time Spent: Greater than 30 Minutes

## 2019-10-07 NOTE — PDOC H&P
History of Present Illness


Admission Date/PCP: 


10/6/2019  23:10


No local PCP


Patient complains of: Headache


History of Present Illness: 


MARCO MACDONALD is a 64 year old female who presented to the emergency room with 

a 1 day history of headache.  Patient admits to a headache of gradual onset yes

terday becoming more intense throughout the day today, and becoming severe at 

the time of her presentation to the emergency room.  She describes the headache 

as a severe sharp throbbing in the bifrontal area behind her eyes, preceded by 

bilateral visual stigmata of flashing lights and stars.  She admits her headache

increases in intensity with exposure to bright light and noise.  Additionally 

her headache has been accompanied by nausea with vomiting and a syncopal episode

(unwitnessed collapsed to the floor with no apparent injury) just prior to her 

presenting to the emergency room.  She denies other associated or accompanying 

signs and symptoms.  She denies prior similar episodes and is not prone to 

headaches.  She has not identified any aggravating or ameliorating factors for 

her headache.  In the emergency room she was found to have a CT scan of the head

which showed several foci of poorly defined decreased attenuation of the white 

matter being suspicious for neoplasm.  The patient was subsequently admitted to 

observation for further evaluation and treatment.








Past Medical History


Cardiac Medical History: Reports: Coronary Artery Disease, Myocardial Infarction

- "3 small heart attacks", Hypertension


   Denies: Atrial Fibrillation, Hyperlipidema


Pulmonary Medical History: 


   Denies: Asthma, Chronic Obstructive Pulmonary Disease (COPD)


EENT Medical History: 


   Denies: Cataracts, Nose - Allergic rhinitis


Neurological Medical History: 


   Denies: Hemorrhagic CVA, Ischemic CVA, Migraine, Seizures


Endocrine Medical History: 


   Denies: Diabetes Mellitus Type 1, Diabetes Mellitus Type 2, Hyperthyroidism, 

Hypothyroidism


Renal/ Medical History: 


   Denies: Chronic Kidney Disease, Nephrolithiasis


Malignancy Medical History: Reports: None


GI Medical History: Reports: Peptic Ulcer Disease


   Denies: Cirrhosis, Crohn's Disease, Gastroesophageal Reflux Disease, 

Hepatitis, Ulcerative Colitis


Musculoskeltal Medical History: Reports: Other - Back problems


   Denies: Arthritis, Gout


Skin Medical History: 


   Denies: Eczema, Psoriasis


Psychiatric Medical History: 


   Denies: Alcohol Dependency, Substance Abuse, Tobacco Dependency


Traumatic Medical History: Reports: None


Hematology: 


   Denies: Anemia, Bleeding Tendencies


Infectious Medical History: Reports: None





Past Surgical History


Past Surgical History: Reports: Cardiac Catheterization, Gastric Bypass Surgery,

Orthopedic Surgery - 5 back surgeries





Social History


Information Source: Patient


Lives with: Spouse/Significant other


Smoking Status: Former Smoker


Electronic Cigarette use?: No


Frequency of Alcohol Use: Rare


Hx Recreational Drug Use: No


Drugs: None


Hx Prescription Drug Abuse: No





- Advance Directive


Resuscitation Status: Full Code


Surrogate healthcare decision maker:: 


[place]





Family History


Family History: CAD, CVA, Hypertension, Other - Seizure disorder.  denies: DM, 

Malignancy


Parental Family History Reviewed: Yes


Children Family History Reviewed: No


Sibling(s) Family History Reviewed.: Yes





Medication/Allergy


Home Medications: 








No Home Medications  10/07/19 








Allergies/Adverse Reactions: 


                                        





No Known Allergies Allergy (Unverified 09/05/19 12:10)


   











Review of Systems


Constitutional: PRESENT: as per HPI, headache(s).  ABSENT: chills, fever(s)


Eyes: PRESENT: as per HPI, visual disturbances.  ABSENT: other - Eye pain


Ears: ABSENT: hearing changes, other


Nose, Mouth, and Throat: PRESENT: as per HPI, headache(s).  ABSENT: mouth pain, 

sore throat


Cardiovascular: ABSENT: chest pain, palpitations


Respiratory: ABSENT: cough, dyspnea


Gastrointestinal: PRESENT: as per HPI, nausea, vomiting.  ABSENT: abdominal 

pain, constipation, diarrhea


Genitourinary: ABSENT: dysuria, hematuria


Musculoskeletal: PRESENT: other - Recent fall with a fracture of the left 

forearm.  ABSENT: back pain, joint swelling, muscle weakness


Integumentary: ABSENT: pruritus, rash


Neurological: PRESENT: as per HPI, memory loss - For the last 6 months, syncope.

 ABSENT: confusion, convulsions, focal weakness


Psychiatric: ABSENT: anxiety, depression


Endocrine: ABSENT: cold intolerance, heat intolerance


Hematologic/Lymphatic: ABSENT: easy bleeding, easy bruising


Allergic/Immunologic: ABSENT: seasonal rhinorrhea





Physical Exam


Vital Signs: 


                                        











Temp Pulse Resp BP Pulse Ox


 


 97.8 F      14   174/91 H  97 


 


 10/06/19 18:25     10/06/19 23:01  10/06/19 23:01  10/06/19 23:01








                                 Intake & Output











 10/04/19 10/05/19 10/06/19





 23:59 23:59 23:59


 


Intake Total   1000


 


Balance   1000


 


Weight   68.039 kg











General appearance: PRESENT: no acute distress, cooperative


Head exam: PRESENT: atraumatic, normocephalic


Eye exam: PRESENT: conjunctiva pink.  ABSENT: conjunctival injection, scleral 

icterus


Ear exam: PRESENT: normal external ear exam.  ABSENT: bleeding, drainage


Mouth exam: PRESENT: dry mucosa, neck supple


Neck exam: ABSENT: thyromegaly, tracheal deviation


Respiratory exam: PRESENT: clear to auscultation claudia, symmetrical, unlabored


Cardiovascular exam: PRESENT: RRR.  ABSENT: clicks, gallop, rubs


Pulses: PRESENT: normal radial pulses, normal dorsalis pedis pul


Vascular exam: PRESENT: normal capillary refill.  ABSENT: pallor


GI/Abdominal exam: PRESENT: normal bowel sounds, soft


Rectal exam: PRESENT: deferred


Extremities exam: PRESENT: other - Cast on the left forearm.  ABSENT: joint 

swelling, pedal edema


Musculoskeletal exam: ABSENT: deformity, dislocation


Neurological exam: PRESENT: alert, oriented to person, oriented to place, 

oriented to time, oriented to situation, CN II-XII grossly intact.  ABSENT: 

motor sensory deficit


Psychiatric exam: PRESENT: appropriate affect, normal mood


Skin exam: PRESENT: dry, intact, warm.  ABSENT: jaundice, rash, urticaria





Results


Laboratory Results: 


                                        





                                 10/06/19 18:27 





                                 10/06/19 18:27 





                                        











  10/06/19 10/06/19





  18:27 18:27


 


WBC  5.4 


 


RBC  4.81 


 


Hgb  14.4 


 


Hct  43.7 


 


MCV  91 


 


MCH  30.0 


 


MCHC  33.0 


 


RDW  14.7 H 


 


Plt Count  310 


 


Seg Neutrophils %  62.2 


 


Sodium   139.2


 


Potassium   4.7


 


Chloride   100


 


Carbon Dioxide   31 H


 


Anion Gap   8


 


BUN   10


 


Creatinine   0.63


 


Est GFR (African Amer)   > 60


 


Glucose   101


 


Calcium   9.9


 


Total Bilirubin   0.5


 


AST   214 H


 


Alkaline Phosphatase   97


 


Total Protein   7.4


 


Albumin   4.3








                                        











  10/06/19





  18:27


 


Troponin I  < 0.012











Impressions: 


                                        





Head CT  10/06/19 20:06


IMPRESSION: Findings are most concerning for neoplasm but


ischemia or inflammation are certainly also possible and


follow-up MRI is recommended for further evaluation if the


clinical picture is unclear.


 


TECHNIQUE:


Images stored on PACS.


 


All CT scanners at this facility use dose modulation, iterative


reconstruction, and/or weight based dosing when appropriate to


reduce radiation dose to as low as reasonably achievable (ALARA).


 


 


[facility]: Dose Right [facility]: CareDose   [facility]: Dose Right    [facility]:


Teradose 4D    [facility]: [employer]


 


LIMITATIONS:


None.


 


FINDINGS:


 


 


 


 


IMPRESSION:


 


 


 


TECHNICAL DOCUMENTATION:


 


Quality ID # 436: Final reports with documentation of one or more


dose reduction techniques (e.g., Automated exposure control,


adjustment of the mA and/or kV according to patient size, use of


iterative reconstruction technique)


 


copyright 2011 [employer]- All Rights Reserved


 














Assessment and Plan





- Diagnosis


(1) Frontal headache


Is this a current diagnosis for this admission?: Yes   





(2) Episode of syncope


Qualifiers: 


   Syncope type: unspecified   Qualified Code(s): R55 - Syncope and collapse   


Is this a current diagnosis for this admission?: Yes   





(3) Abnormal CT scan of head


Is this a current diagnosis for this admission?: Yes   





(4) Nausea and vomiting


Qualifiers: 


   Vomiting type: unspecified   Vomiting Intractability: non-intractable   

Qualified Code(s): R11.2 - Nausea with vomiting, unspecified   


Is this a current diagnosis for this admission?: Yes   





(5) Hypertension


Qualifiers: 


   Hypertension type: essential hypertension   Qualified Code(s): I10 - 

Essential (primary) hypertension   


Is this a current diagnosis for this admission?: Yes   





- Plan Summary


Summary: 


Patient is admitted observation status and will be treated with IV fluids, 

analgesia for her headache and antiemetics for her nausea and vomiting.  She 

will be on telemetry monitoring as part of observation for any arrhythmia that 

may have resulted in her syncopal episode.  An MRI of the head with and without 

contrast will be performed in the morning and further evaluation and treatment 

will be based upon the results of that test and her response to initial therapy.

 Analgesics will include Nubain 5 to 10 mg IV every 3 hours on an as-needed 

basis per sliding scale for pain.





- Time


Time Spent with patient: 25-34 minutes


Anticipated discharge: Home





- Inpatient Certification


Based on my medical assessment, after consideration of the patient's 

comorbidities, presenting symptoms, or acuity I expect that the services needed 

warrant INPATIENT care.: No


I certify that my determination is in accordance with my understanding of 

Medicare's requirements for reasonable and necessary INPATIENT services [42 CFR 

412.3e].: No


Medical Necessity: Need For IV Fluids, Need For Continuous Telemetry Monitoring,

Need for Neurological Checks

## 2019-10-08 RX ADMIN — Medication SCH: at 13:11

## 2019-10-08 RX ADMIN — Medication SCH ML: at 05:57

## 2019-10-08 RX ADMIN — Medication SCH ML: at 21:50

## 2019-10-08 RX ADMIN — HEPARIN SODIUM SCH UNIT: 5000 INJECTION, SOLUTION INTRAVENOUS; SUBCUTANEOUS at 13:57

## 2019-10-08 RX ADMIN — DEXTROSE, SODIUM CHLORIDE, SODIUM LACTATE, POTASSIUM CHLORIDE, AND CALCIUM CHLORIDE PRN MLS/HR: 5; .6; .31; .03; .02 INJECTION, SOLUTION INTRAVENOUS at 07:38

## 2019-10-08 RX ADMIN — HEPARIN SODIUM SCH UNIT: 5000 INJECTION, SOLUTION INTRAVENOUS; SUBCUTANEOUS at 21:29

## 2019-10-08 RX ADMIN — DEXAMETHASONE SODIUM PHOSPHATE SCH MG: 4 INJECTION, SOLUTION INTRAMUSCULAR; INTRAVENOUS at 21:26

## 2019-10-08 RX ADMIN — PHENYTOIN SODIUM SCH MG: 50 INJECTION INTRAMUSCULAR; INTRAVENOUS at 13:55

## 2019-10-08 RX ADMIN — FAMOTIDINE SCH MG: 20 TABLET, FILM COATED ORAL at 21:25

## 2019-10-08 RX ADMIN — HYDROMORPHONE HYDROCHLORIDE PRN MG: 2 INJECTION INTRAMUSCULAR; INTRAVENOUS; SUBCUTANEOUS at 16:56

## 2019-10-08 RX ADMIN — HYDROMORPHONE HYDROCHLORIDE PRN MG: 2 INJECTION INTRAMUSCULAR; INTRAVENOUS; SUBCUTANEOUS at 10:36

## 2019-10-08 RX ADMIN — HEPARIN SODIUM SCH UNIT: 5000 INJECTION, SOLUTION INTRAVENOUS; SUBCUTANEOUS at 05:53

## 2019-10-08 RX ADMIN — DEXTROSE, SODIUM CHLORIDE, SODIUM LACTATE, POTASSIUM CHLORIDE, AND CALCIUM CHLORIDE PRN MLS/HR: 5; .6; .31; .03; .02 INJECTION, SOLUTION INTRAVENOUS at 12:30

## 2019-10-08 RX ADMIN — HYDROMORPHONE HYDROCHLORIDE PRN MG: 2 INJECTION INTRAMUSCULAR; INTRAVENOUS; SUBCUTANEOUS at 02:12

## 2019-10-08 RX ADMIN — AMLODIPINE BESYLATE SCH MG: 5 TABLET ORAL at 10:36

## 2019-10-08 RX ADMIN — PHENYTOIN SODIUM SCH MG: 50 INJECTION INTRAMUSCULAR; INTRAVENOUS at 05:53

## 2019-10-08 RX ADMIN — PHENYTOIN SODIUM SCH MG: 50 INJECTION INTRAMUSCULAR; INTRAVENOUS at 21:29

## 2019-10-08 RX ADMIN — DOCUSATE SODIUM SCH MG: 100 CAPSULE, LIQUID FILLED ORAL at 10:36

## 2019-10-08 RX ADMIN — HYDROMORPHONE HYDROCHLORIDE PRN MG: 2 INJECTION INTRAMUSCULAR; INTRAVENOUS; SUBCUTANEOUS at 13:45

## 2019-10-08 RX ADMIN — DEXAMETHASONE SODIUM PHOSPHATE SCH MG: 4 INJECTION, SOLUTION INTRAMUSCULAR; INTRAVENOUS at 15:38

## 2019-10-08 RX ADMIN — DOCUSATE SODIUM SCH MG: 100 CAPSULE, LIQUID FILLED ORAL at 17:00

## 2019-10-08 RX ADMIN — HYDROMORPHONE HYDROCHLORIDE PRN MG: 2 INJECTION INTRAMUSCULAR; INTRAVENOUS; SUBCUTANEOUS at 20:14

## 2019-10-08 RX ADMIN — DEXAMETHASONE SODIUM PHOSPHATE PRN MG: 10 INJECTION INTRAMUSCULAR; INTRAVENOUS at 07:42

## 2019-10-08 RX ADMIN — FAMOTIDINE SCH MG: 20 TABLET, FILM COATED ORAL at 10:35

## 2019-10-08 NOTE — PDOC PROGRESS REPORT
Subjective


Progress Note for:: 10/08/19


Subjective:: 


The patient is a 64-year-old female with a past medical history of CAD, MI, 

hypertension, PAD, chronic back pain who was admitted 10/6/2019 for severe 

headache and subsequently found to have right posterior temporal and occipital 

cortical and subcu cortical abnormalities; infarct versus tumefactive MS versus 

asymmetric posterior reversible encephalopathy.


Previous provider has arranged for transfer to [facility] medical services with 

neurology consultation.  She is awaiting room assignment/transfer at this time.





Patient was seen on morning rounds with multiple family members present.  She is

found sitting up in bed on room air.  She is clearly photophobic and p

honophobic.  She reports continued frontal headache; relieved by IV medications,

however notes that her relief is very short-lived (approximately 30 to 45 

minutes of pain relief).  She does report nausea and occasional blurred vision 

but without emesis.


She denies fever, chills, chest pain, palpitations, dyspnea, abdominal pain, 

nausea, vomiting, diarrhea, paresthesias and focal deficits.


They have no new questions or concerns at this time.


No concerns per nursing.


Reason For Visit: 


HEADACHE,NAUSEA AND VOMITING,SYNCOPAL EPISODE








Physical Exam


Vital Signs: 


                                        











Temp Pulse Resp BP Pulse Ox


 


 97.7 F   82   15   151/83 H  100 


 


 10/08/19 16:44  10/08/19 16:44  10/08/19 03:04  10/08/19 16:44  10/08/19 16:44








                                 Intake & Output











 10/07/19 10/08/19 10/09/19





 06:59 06:59 06:59


 


Intake Total 1000 4650 1567


 


Output Total 900 625 500


 


Balance 100 4025 1067


 


Weight 68.7 kg 67.5 kg 











General appearance: PRESENT: no acute distress, cooperative, well-developed, 

well-nourished


Head exam: PRESENT: atraumatic, normocephalic


Eye exam: PRESENT: conjunctiva pink, EOMI, PERRLA.  ABSENT: scleral icterus


Ear exam: PRESENT: normal external ear exam


Mouth exam: PRESENT: moist, tongue midline


Neck exam: ABSENT: carotid bruit, JVD, lymphadenopathy, thyromegaly


Respiratory exam: PRESENT: clear to auscultation claudia, symmetrical, unlabored.  

ABSENT: rales, rhonchi, wheezes


Cardiovascular exam: PRESENT: RRR, +S1, +S2.  ABSENT: diastolic murmur, rubs, 

systolic murmur


Pulses: PRESENT: normal dorsalis pedis pul


Vascular exam: PRESENT: normal capillary refill


GI/Abdominal exam: PRESENT: normal bowel sounds, soft.  ABSENT: distended, 

guarding, mass, organolmegaly, rebound, tenderness


Rectal exam: PRESENT: deferred


Extremities exam: PRESENT: full ROM.  ABSENT: calf tenderness, clubbing, pedal 

edema


Neurological exam: PRESENT: alert, awake, oriented to person, oriented to place,

oriented to time, oriented to situation, CN II-XII grossly intact.  ABSENT: 

motor sensory deficit


Psychiatric exam: PRESENT: appropriate affect, normal mood.  ABSENT: homicidal 

ideation, suicidal ideation


Skin exam: PRESENT: dry, intact, warm.  ABSENT: cyanosis, rash





Results


Laboratory Results: 


                                        





                                 10/06/19 18:27 





                                 10/06/19 18:27 





                                        











  10/06/19 10/07/19 10/07/19





  18:27 00:36 00:36


 


Creatine Kinase   24 L 


 


CK-MB (CK-2)    0.23


 


Troponin I  < 0.012   < 0.012














  10/07/19 10/07/19 10/07/19





  06:40 06:40 13:46


 


Creatine Kinase  < 20 L   47


 


CK-MB (CK-2)   < 0.22 


 


Troponin I   < 0.012 














  10/07/19





  13:46


 


Creatine Kinase 


 


CK-MB (CK-2)  0.30


 


Troponin I  0.016











Impressions: 


                                        





Head CT  10/06/19 20:06


IMPRESSION: Findings are most concerning for neoplasm but


ischemia or inflammation are certainly also possible and


follow-up MRI is recommended for further evaluation if the


clinical picture is unclear.


 


TECHNIQUE:


Images stored on PACS.


 


All CT scanners at this facility use dose modulation, iterative


reconstruction, and/or weight based dosing when appropriate to


reduce radiation dose to as low as reasonably achievable (ALARA).


 


 


[facility]: Dose Right [facility]: CareDose   [facility]: Dose Right    [facility]:


Teradose 4D    [facility]: Smart Technologies


 


LIMITATIONS:


None.


 


FINDINGS:


 


 


 


 


IMPRESSION:


 


 


 


TECHNICAL DOCUMENTATION:


 


Quality ID # 436: Final reports with documentation of one or more


dose reduction techniques (e.g., Automated exposure control,


adjustment of the mA and/or kV according to patient size, use of


iterative reconstruction technique)


 


copyright 2011 [employer]- All Rights Reserved


 








Head MRI  10/07/19 00:00


IMPRESSION:  Right posterior temporal/occipital cortical and subcortical white 

matter signal abnormalities without altered diffusion.  Differential is venous 

infarct vs tumefactive MS vs asymmetric posterior reversible encephalopathy


EVIDENCE OF ACUTE STROKE: Possible atypical venous infarct.


 














Assessment and Plan





- Diagnosis


(1) Abnormal MRI of head


Is this a current diagnosis for this admission?: Yes   


Plan: 


MRI revealed right posterior temporal and occipital cortical and subcu cortical 

abnormalities; infarct versus tumefactive MS versus asymmetric posterior 

reversible encephalopathy.


The previous provider has arranged for transfer to [facility]; currently awaiting 

bed assignment and transfer.


Continue IV Dilantin.


Continue analgesics as needed; have increased Dilaudid 2 mg every 3 hours.  

Continue Tylenol.


Antiemetics as needed.


Start IV dexamethasone.


Nonpharmacological interventions; head of bed elevated, low light, sound, 

visitor stimulation.


Neuro checks every 6 hours.


Management of hypertension as below.








(2) Frontal headache


Is this a current diagnosis for this admission?: Yes   


Plan: 


Secondary to #1.


Evaluation management as above.








(3) Coronary artery disease


Is this a current diagnosis for this admission?: Yes   


Plan: 


Troponins negative x4.


Continue monitor on continuous cardiac telemetry.


Holding aspirin secondary to #1.











(4) Hypertension


Qualifiers: 


   Hypertension type: essential hypertension   Qualified Code(s): I10 - 

Essential (primary) hypertension   


Is this a current diagnosis for this admission?: Yes   


Plan: 


The patient is not on home antihypertensive therapy.


Blood pressures remain elevated.  Will increase amlodipine to 10 mg daily.


Cardiac diet.








(5) Episode of syncope


Qualifiers: 


   Syncope type: unspecified   Qualified Code(s): R55 - Syncope and collapse   


Is this a current diagnosis for this admission?: Yes   


Plan: 


Likely secondary to #1.


Evaluation management as above.


Fall precautions.








- Time


Time Spent with patient: 25-34 minutes


Medications reviewed and adjusted accordingly: Yes


Anticipated discharge: [facility]


Within: when bed available

## 2019-10-09 VITALS — SYSTOLIC BLOOD PRESSURE: 130 MMHG | DIASTOLIC BLOOD PRESSURE: 64 MMHG

## 2019-10-09 LAB
ANION GAP SERPL CALC-SCNC: 9 MMOL/L (ref 5–19)
BUN SERPL-MCNC: 12 MG/DL (ref 7–20)
CALCIUM: 9.2 MG/DL (ref 8.4–10.2)
CHLORIDE SERPL-SCNC: 100 MMOL/L (ref 98–107)
CO2 SERPL-SCNC: 31 MMOL/L (ref 22–30)
CRP SERPL-MCNC: < 5 MG/L (ref ?–10)
ERYTHROCYTE [DISTWIDTH] IN BLOOD BY AUTOMATED COUNT: 15 % (ref 11.5–14)
ERYTHROCYTE [SEDIMENTATION RATE] IN BLOOD: 16 MM/HR (ref 0–30)
GLUCOSE SERPL-MCNC: 142 MG/DL (ref 75–110)
HCT VFR BLD CALC: 39.5 % (ref 36–47)
HGB BLD-MCNC: 13 G/DL (ref 12–15.5)
MCH RBC QN AUTO: 29.9 PG (ref 27–33.4)
MCHC RBC AUTO-ENTMCNC: 32.9 G/DL (ref 32–36)
MCV RBC AUTO: 91 FL (ref 80–97)
PLATELET # BLD: 269 10^3/UL (ref 150–450)
POTASSIUM SERPL-SCNC: 3.9 MMOL/L (ref 3.6–5)
RBC # BLD AUTO: 4.35 10^6/UL (ref 3.72–5.28)
WBC # BLD AUTO: 11 10^3/UL (ref 4–10.5)

## 2019-10-09 RX ADMIN — PHENYTOIN SODIUM SCH MG: 50 INJECTION INTRAMUSCULAR; INTRAVENOUS at 14:49

## 2019-10-09 RX ADMIN — HYDROMORPHONE HYDROCHLORIDE PRN MG: 2 INJECTION INTRAMUSCULAR; INTRAVENOUS; SUBCUTANEOUS at 07:01

## 2019-10-09 RX ADMIN — DEXAMETHASONE SODIUM PHOSPHATE SCH: 4 INJECTION, SOLUTION INTRAMUSCULAR; INTRAVENOUS at 22:02

## 2019-10-09 RX ADMIN — PHENYTOIN SODIUM SCH MG: 50 INJECTION INTRAMUSCULAR; INTRAVENOUS at 06:20

## 2019-10-09 RX ADMIN — DEXAMETHASONE SODIUM PHOSPHATE SCH MG: 4 INJECTION, SOLUTION INTRAMUSCULAR; INTRAVENOUS at 14:52

## 2019-10-09 RX ADMIN — DOCUSATE SODIUM SCH: 100 CAPSULE, LIQUID FILLED ORAL at 10:21

## 2019-10-09 RX ADMIN — DEXTROSE, SODIUM CHLORIDE, SODIUM LACTATE, POTASSIUM CHLORIDE, AND CALCIUM CHLORIDE PRN MLS/HR: 5; .6; .31; .03; .02 INJECTION, SOLUTION INTRAVENOUS at 06:14

## 2019-10-09 RX ADMIN — Medication SCH: at 22:00

## 2019-10-09 RX ADMIN — Medication SCH: at 14:17

## 2019-10-09 RX ADMIN — HEPARIN SODIUM SCH UNIT: 5000 INJECTION, SOLUTION INTRAVENOUS; SUBCUTANEOUS at 22:00

## 2019-10-09 RX ADMIN — HYDROMORPHONE HYDROCHLORIDE PRN MG: 2 INJECTION INTRAMUSCULAR; INTRAVENOUS; SUBCUTANEOUS at 18:26

## 2019-10-09 RX ADMIN — HYDROMORPHONE HYDROCHLORIDE PRN MG: 2 INJECTION INTRAMUSCULAR; INTRAVENOUS; SUBCUTANEOUS at 18:59

## 2019-10-09 RX ADMIN — DEXTROSE, SODIUM CHLORIDE, SODIUM LACTATE, POTASSIUM CHLORIDE, AND CALCIUM CHLORIDE PRN MLS/HR: 5; .6; .31; .03; .02 INJECTION, SOLUTION INTRAVENOUS at 13:28

## 2019-10-09 RX ADMIN — HYDROMORPHONE HYDROCHLORIDE PRN MG: 2 INJECTION INTRAMUSCULAR; INTRAVENOUS; SUBCUTANEOUS at 10:19

## 2019-10-09 RX ADMIN — FAMOTIDINE SCH MG: 20 TABLET, FILM COATED ORAL at 21:59

## 2019-10-09 RX ADMIN — Medication SCH ML: at 07:00

## 2019-10-09 RX ADMIN — DEXAMETHASONE SODIUM PHOSPHATE PRN MG: 10 INJECTION INTRAMUSCULAR; INTRAVENOUS at 04:12

## 2019-10-09 RX ADMIN — HYDRALAZINE HYDROCHLORIDE PRN MG: 20 INJECTION INTRAMUSCULAR; INTRAVENOUS at 10:17

## 2019-10-09 RX ADMIN — HYDROMORPHONE HYDROCHLORIDE PRN MG: 2 INJECTION INTRAMUSCULAR; INTRAVENOUS; SUBCUTANEOUS at 13:25

## 2019-10-09 RX ADMIN — FAMOTIDINE SCH MG: 20 TABLET, FILM COATED ORAL at 10:20

## 2019-10-09 RX ADMIN — HYDROMORPHONE HYDROCHLORIDE PRN MG: 2 INJECTION INTRAMUSCULAR; INTRAVENOUS; SUBCUTANEOUS at 04:01

## 2019-10-09 RX ADMIN — HEPARIN SODIUM SCH UNIT: 5000 INJECTION, SOLUTION INTRAVENOUS; SUBCUTANEOUS at 06:25

## 2019-10-09 RX ADMIN — DEXAMETHASONE SODIUM PHOSPHATE SCH MG: 4 INJECTION, SOLUTION INTRAMUSCULAR; INTRAVENOUS at 06:15

## 2019-10-09 RX ADMIN — HYDROMORPHONE HYDROCHLORIDE PRN MG: 2 INJECTION INTRAMUSCULAR; INTRAVENOUS; SUBCUTANEOUS at 00:47

## 2019-10-09 RX ADMIN — HEPARIN SODIUM SCH UNIT: 5000 INJECTION, SOLUTION INTRAVENOUS; SUBCUTANEOUS at 14:47

## 2019-10-09 RX ADMIN — PHENYTOIN SODIUM SCH: 50 INJECTION INTRAMUSCULAR; INTRAVENOUS at 22:01

## 2019-10-09 RX ADMIN — DOCUSATE SODIUM SCH: 100 CAPSULE, LIQUID FILLED ORAL at 18:14

## 2019-10-09 NOTE — PDOC PROGRESS REPORT
Subjective


Progress Note for:: 10/09/19


Subjective:: 





No adverse events overnight.  Blood pressure still intermittently high.  She 

still complaining of a headache.  No fevers or nuchal rigidity.  She says that 

the Dilaudid helps some but it wears off quickly and it makes her feel bad 

overall.


Reason For Visit: 


HEADACHE,NAUSEA AND VOMITING,SYNCOPAL EPISODE








Physical Exam


Vital Signs: 


                                        











Temp Pulse Resp BP Pulse Ox


 


 98.1 F   95   16   179/65 H  95 


 


 10/09/19 00:53  10/09/19 14:00  10/09/19 00:53  10/09/19 08:40  10/09/19 08:40








                                 Intake & Output











 10/08/19 10/09/19 10/10/19





 06:59 06:59 06:59


 


Intake Total 4650 2807 1000


 


Output Total 625 500 


 


Balance 4025 2307 1000


 


Weight 67.5 kg 71.7 kg 











General appearance: PRESENT: no acute distress, cooperative, disheveled


Eye exam: PRESENT: PERRLA.  ABSENT: nystagmus


Neck exam: PRESENT: full ROM.  ABSENT: lymphadenopathy, meningismus, tenderness


Respiratory exam: PRESENT: clear to auscultation claudia, symmetrical, unlabored.  

ABSENT: accessory muscle use, chest wall tenderness, crackles, prolonged 

expiratory phas, rhonchi, tachypnea, wheezes


Cardiovascular exam: PRESENT: RRR, +S1, +S2


Pulses: PRESENT: normal carotid pulses


Vascular exam: PRESENT: normal capillary refill


GI/Abdominal exam: PRESENT: normal bowel sounds, soft.  ABSENT: distended, 

guarding, rebound, tenderness


Extremities exam: ABSENT: clubbing, pedal edema


Musculoskeletal exam: PRESENT: normal inspection.  ABSENT: deformity


Neurological exam: PRESENT: alert, awake, oriented to person, oriented to place,

oriented to time, oriented to situation


Psychiatric exam: PRESENT: appropriate affect, normal mood


Skin exam: PRESENT: dry, warm





Results


Laboratory Results: 


                                        





                                 10/09/19 04:09 





                                 10/09/19 04:09 





                                        











  10/09/19 10/09/19





  04:09 04:09


 


WBC  11.0 H D 


 


RBC  4.35 


 


Hgb  13.0 


 


Hct  39.5 


 


MCV  91 


 


MCH  29.9 


 


MCHC  32.9 


 


RDW  15.0 H 


 


Plt Count  269 


 


Sodium   140.3


 


Potassium   3.9


 


Chloride   100


 


Carbon Dioxide   31 H


 


Anion Gap   9


 


BUN   12


 


Creatinine   0.45 L


 


Est GFR (African Amer)   > 60


 


Glucose   142 H


 


Calcium   9.2


 


C-Reactive Protein   < 5.0








                                        











  10/06/19 10/07/19 10/07/19





  18:27 00:36 00:36


 


Creatine Kinase   24 L 


 


CK-MB (CK-2)    0.23


 


Troponin I  < 0.012   < 0.012














  10/07/19 10/07/19 10/07/19





  06:40 06:40 13:46


 


Creatine Kinase  < 20 L   47


 


CK-MB (CK-2)   < 0.22 


 


Troponin I   < 0.012 














  10/07/19





  13:46


 


Creatine Kinase 


 


CK-MB (CK-2)  0.30


 


Troponin I  0.016











Impressions: 


                                        





Head CT  10/06/19 20:06


IMPRESSION: Findings are most concerning for neoplasm but


ischemia or inflammation are certainly also possible and


follow-up MRI is recommended for further evaluation if the


clinical picture is unclear.


 


TECHNIQUE:


Images stored on PACS.


 


All CT scanners at this facility use dose modulation, iterative


reconstruction, and/or weight based dosing when appropriate to


reduce radiation dose to as low as reasonably achievable (ALARA).


 


 


[facility]: Dose Right [facility]: CareDose   [facility]: Dose Right    [facility]:


Teradose 4D    [facility]: [employer]


 


LIMITATIONS:


None.


 


FINDINGS:


 


 


 


 


IMPRESSION:


 


 


 


TECHNICAL DOCUMENTATION:


 


Quality ID # 436: Final reports with documentation of one or more


dose reduction techniques (e.g., Automated exposure control,


adjustment of the mA and/or kV according to patient size, use of


iterative reconstruction technique)


 


copyright 2011 [employer]- All Rights Reserved


 








Head MRI  10/07/19 00:00


IMPRESSION:  Right posterior temporal/occipital cortical and subcortical white 

matter signal abnormalities without altered diffusion.  Differential is venous 

infarct vs tumefactive MS vs asymmetric posterior reversible encephalopathy


EVIDENCE OF ACUTE STROKE: Possible atypical venous infarct.


 














Assessment and Plan





- Diagnosis


(1) Cerebral edema


Is this a current diagnosis for this admission?: Yes   


Plan: 


There is a few different strains things on the differential for this, and this 

is obviously the cause of her headache, as she is on the transfer list for 

[facility].  We got her on some Decadron as well as some Dilantin in case this is a 

cerebral venous thrombosis.  I am going to give her a dose of Compazine and 

Toradol together to see if this will help with her headache.








- Time


Time Spent with patient: 15-24 minutes

## 2019-10-10 NOTE — LEFT AGAINST MEDICAL ADVICE
Against Medical Advice


Admission Date/Time: 10/06/19 23:36





 Primary Care Provider: 








Date of Patient Emigration: 10/10/19





- Diagnosis:


(1) Cerebral edema


Is this a current diagnosis for this admission?: Yes   





- Summary:


Summary: 


Please see Admission and Progress Notes as well.





MARCO MACDONALD is a 64 F, who LEFT AGAINST MEDICAL ADVICE.





The Patient was admitted on 10/06/19 23:36.